# Patient Record
Sex: MALE | Race: WHITE | NOT HISPANIC OR LATINO | URBAN - METROPOLITAN AREA
[De-identification: names, ages, dates, MRNs, and addresses within clinical notes are randomized per-mention and may not be internally consistent; named-entity substitution may affect disease eponyms.]

---

## 2022-03-31 ENCOUNTER — OFFICE VISIT (OUTPATIENT)
Dept: FAMILY MEDICINE | Facility: CLINIC | Age: 27
End: 2022-03-31
Payer: COMMERCIAL

## 2022-03-31 VITALS
WEIGHT: 205 LBS | TEMPERATURE: 98.3 F | BODY MASS INDEX: 38.71 KG/M2 | HEART RATE: 73 BPM | SYSTOLIC BLOOD PRESSURE: 124 MMHG | DIASTOLIC BLOOD PRESSURE: 78 MMHG | OXYGEN SATURATION: 96 % | HEIGHT: 61 IN

## 2022-03-31 DIAGNOSIS — F90.0 ATTENTION DEFICIT HYPERACTIVITY DISORDER (ADHD), PREDOMINANTLY INATTENTIVE TYPE: Primary | ICD-10-CM

## 2022-03-31 DIAGNOSIS — F31.61 BIPOLAR DISORDER, CURRENT EPISODE MIXED, MILD (H): ICD-10-CM

## 2022-03-31 DIAGNOSIS — J30.1 NON-SEASONAL ALLERGIC RHINITIS DUE TO POLLEN: ICD-10-CM

## 2022-03-31 DIAGNOSIS — F51.01 PRIMARY INSOMNIA: ICD-10-CM

## 2022-03-31 DIAGNOSIS — F33.0 MILD EPISODE OF RECURRENT MAJOR DEPRESSIVE DISORDER (H): ICD-10-CM

## 2022-03-31 PROCEDURE — 99203 OFFICE O/P NEW LOW 30 MIN: CPT | Performed by: NURSE PRACTITIONER

## 2022-03-31 RX ORDER — DEXTROAMPHETAMINE SACCHARATE, AMPHETAMINE ASPARTATE MONOHYDRATE, DEXTROAMPHETAMINE SULFATE AND AMPHETAMINE SULFATE 2.5; 2.5; 2.5; 2.5 MG/1; MG/1; MG/1; MG/1
10 CAPSULE, EXTENDED RELEASE ORAL DAILY
Qty: 30 CAPSULE | Refills: 0 | Status: SHIPPED | OUTPATIENT
Start: 2022-03-31 | End: 2022-05-12

## 2022-03-31 RX ORDER — CITALOPRAM HYDROBROMIDE 20 MG/1
1 TABLET ORAL DAILY
COMMUNITY
Start: 2021-01-11

## 2022-03-31 RX ORDER — LAMOTRIGINE 100 MG/1
200 TABLET ORAL
COMMUNITY
Start: 2021-04-05 | End: 2022-05-03

## 2022-03-31 RX ORDER — MONTELUKAST SODIUM 10 MG/1
10 TABLET ORAL EVERY MORNING
Qty: 90 TABLET | Refills: 3 | Status: SHIPPED | OUTPATIENT
Start: 2022-03-31

## 2022-03-31 RX ORDER — TRAZODONE HYDROCHLORIDE 100 MG/1
200 TABLET ORAL AT BEDTIME
Qty: 90 TABLET | Refills: 1 | Status: SHIPPED | OUTPATIENT
Start: 2022-03-31 | End: 2022-05-12

## 2022-03-31 RX ORDER — TRAZODONE HYDROCHLORIDE 100 MG/1
200 TABLET ORAL DAILY
COMMUNITY
Start: 2021-04-20 | End: 2022-03-31

## 2022-03-31 RX ORDER — MONTELUKAST SODIUM 10 MG/1
10 TABLET ORAL EVERY MORNING
COMMUNITY
End: 2022-03-31

## 2022-03-31 RX ORDER — DEXTROAMPHETAMINE SACCHARATE, AMPHETAMINE ASPARTATE MONOHYDRATE, DEXTROAMPHETAMINE SULFATE AND AMPHETAMINE SULFATE 2.5; 2.5; 2.5; 2.5 MG/1; MG/1; MG/1; MG/1
10 CAPSULE, EXTENDED RELEASE ORAL DAILY
COMMUNITY
Start: 2021-07-21 | End: 2022-03-31

## 2022-03-31 ASSESSMENT — ANXIETY QUESTIONNAIRES
IF YOU CHECKED OFF ANY PROBLEMS ON THIS QUESTIONNAIRE, HOW DIFFICULT HAVE THESE PROBLEMS MADE IT FOR YOU TO DO YOUR WORK, TAKE CARE OF THINGS AT HOME, OR GET ALONG WITH OTHER PEOPLE: NOT DIFFICULT AT ALL
3. WORRYING TOO MUCH ABOUT DIFFERENT THINGS: NOT AT ALL
1. FEELING NERVOUS, ANXIOUS, OR ON EDGE: NOT AT ALL
2. NOT BEING ABLE TO STOP OR CONTROL WORRYING: NOT AT ALL
5. BEING SO RESTLESS THAT IT IS HARD TO SIT STILL: NOT AT ALL
7. FEELING AFRAID AS IF SOMETHING AWFUL MIGHT HAPPEN: NOT AT ALL
GAD7 TOTAL SCORE: 0
6. BECOMING EASILY ANNOYED OR IRRITABLE: NOT AT ALL

## 2022-03-31 ASSESSMENT — PATIENT HEALTH QUESTIONNAIRE - PHQ9
5. POOR APPETITE OR OVEREATING: NOT AT ALL
SUM OF ALL RESPONSES TO PHQ QUESTIONS 1-9: 4

## 2022-03-31 NOTE — PATIENT INSTRUCTIONS
Patient Education     WELCOME to [place]! It was very nice to meet you today, Darden!    - I refilled medications and sent them to the [facility] pharmacy on file   - A mental health referral was placed today for a psychiatrist medication evaluation and  management. [facility] will call you to coordinate your care as prescribed by your  provider. If you don't hear from a representative within 2 business days, please call [phone].   - Please see below for additional resources and information on some of your chronic health  conditions.  [place] Mental Health Resources    [facility], Peer Led Support Groups for Recovery & Wellness: https://mentalhealthmn.org/support/support-groups/    [facility] support groups: https://mentalhealthmn.org/support/support-groups/    [facility] support groups: http://www.[facility].org/support/support-groups.html    [facility] Peer Connection support groups: http://[facility].org/what-we-do/support-services/people-living-with-mental-illness.html    [place] Recovery Connection, support, care and resources to achieve recovery from addiction: https://[facility].org/upcoming-events/category/all-recovery-meetings/month/    Suicide Awareness Voices of Education (SAVE), grief support groups for those who have lost a friend or family member to suicide: https://save.org/what-we-do/grief-support/find-a-support-group/  [place] Mental Health Resources:    National Institutes of Mental Trdfxk610-030-5849jdp.[facility].nih.gov    National [place] on Mental Znpnddv997-169-2121wlr.gamaliel.org     Mental Health Lpqqucu237-835-1027mfg.nmha.org    National Suicide Oxkutgf700-852-1318 (800-SUICIDE)    National Suicide Prevention Qnehllzh120-260-4888 (606-869-OXKO)www.suicidepreventionlifeline.org  Attention-Deficit/Hyperactivity Disorder (ADHD) in Adults  You ve always had trouble concentrating. Your mind wanders, and it s hard to finish tasks. As a result, you  didn t do well in school. And now, you often struggle with your job. Sometimes this makes you syed or depressed. These may be symptoms of attention-deficit/hyperactivity disorder (ADHD). To find out more, talk to your healthcare provider. He or she can offer guidance and support.  Symptoms of ADHD in adults  For an adult to be diagnosed with ADHD, the symptoms must have been present since childhood. The symptoms may include:    Trouble thinking things through    Low self-esteem    Depression    Trouble holding a job    Memory problems    Problems with a marriage or relationship    Lack of discipline    Trouble finishing tasks or projects  What is ADHD?  Attention-deficit/hyperactivity disorder makes it hard to focus your mind. You may daydream a lot. And you may be restless much of the time. As a result, you may have trouble with detailed or boring work. And it may be hard to stick with one project for very long. You also may forget things. Or, you may miss key points during a lecture or meeting. You may even have trouble sitting through a movie or concert. At times, you may feel frustrated or angry. This can affect your relationships with others.  Who does it affect?  ADHD starts in childhood. Sometimes, your symptoms may improve as you get older. But they also may persist into your adult years. ADHD is often thought of as a  kid s problem.  That s why it s often missed in adults. In fact, many parents learn they have ADHD when their children are diagnosed.  What causes it?  The exact cause of ADHD isn t known. The disorder does run in families. Having one parent with ADHD makes it more likely you ll have it too. And the part of your brain that controls attention may be involved. Certain brain chemicals that are out of balance may also play a role.  What can be done?  The first step is finding out if you have ADHD. Your doctor will use special guidelines to diagnose the disorder. Most adults with ADHD are greatly  helped by some combination of medicine, therapy and coaching.  To learn more    Children and Adults with Attention-Deficit/Hyperactivity Disorder (KEO) https://keo.org/    Attention Deficit Disorder Association (ADDA) https://add.org/    Treating Insomnia     Learning to relax before bedtime can improve your sleep.   Good sleeping habits are a key part of treatment. If needed, some medicines may help you sleep better at first. Making healthy lifestyle changes and learning to relax can improve your sleep. Treating insomnia takes commitment. But trust that your efforts will pay off. Be sure to talk with your healthcare provider before taking any medicine.  Healthy lifestyle  Your lifestyle affects your health and your sleep. Here are some healthy habits:    Keep a regular sleep schedule. Go to bed and get up at the same time each day.    Exercise regularly. It may help you reduce stress. Avoid strenuous exercise for 2 to 4 hours before bedtime.    Avoid or limit naps, especially in the late afternoon.    Use your bed only for sleep and sex.    Don t spend too much time in bed trying to fall asleep. If you can t fall asleep, get up and do something (no electronics) until you become tired and drowsy.    Avoid or limit caffeine and nicotine for up to 6 hours before bedtime. They can keep you awake at night.     Also avoid alcohol for at least 4 to 6 hours before bedtime. It may help you fall asleep at first. But you will have more awakenings during the night. And your sleep will not be restful.  Before bedtime  To sleep better every night, try these tips:    Have a bedtime routine to let your body and mind know when it s time to sleep.    Think of going to bed as relaxing and enjoyable. Sleep will come sooner.    If your worries don t let you sleep, write them down in a diary. Then close it, and go to bed.    Make sure the room is not too hot or too cold. If it s not dark enough, an eye mask can help. If it s noisy,  try using earplugs.    Don't eat a large meal just before bedtime.    Remove noises, bright lights, TVs, cell phones, and computers from your sleeping environment.    Use a comfortable mattress and pillow.  Learn to relax  Stress, anxiety, and body tension may keep you awake at night. To unwind before bedtime, try a warm bath, meditation, or yoga. Also try the following:    Deep breathing. Sit or lie back in a chair. Take a slow, deep breath. Hold it for 5 counts. Then breathe out slowly through your mouth. Keep doing this until you feel relaxed.    Progressive muscle relaxation. Tense and then relax the muscles in your body as you breathe deeply. Start with your feet and work up your body to your neck and face.  Cognitive Behavioral Therapy (CBT)  CBT is the most effective treatment for long-term insomnia. It tries to address the underlying causes of your sleep problems, including your habits and how you think about sleep.  Individual Therapy  He Doe PsyD,   Insomnia   [facility]    [facility]: [phone]    [facility]: [phone]  [facility]  Visit www.[place].org or call [phone] to find a clinic close to you.  Suggested resources  The resources below may help you relax. This list is for information only. [facility] is not responsible for the quality of services or the actions of any person or organization. There may be a fee to use some of these resources.  Insomnia treatment books  Darryl Mind by Kathy Ramsey and Kenya Heaton (2013)  Overcoming Insomnia by Patrick Figueroa and Kathy Ramsey (2008)  Quiet Your Mind and Get to Sleep: Solutions to Insomnia for Those with Depression, Anxiety or Chronic Pain by Kathy Heaton (2009)  No More Sleepless Nights by Victorino Nelson and Belle Cheng (1996)  Say Darryl to Insomnia by Steven De La Cruz (2009)  The Insomnia  Workbook by Dian Willingham and Rod Hassan (2009)  The Insomnia Answer by Keith Qiu and Arsenio Malhtora (2006)  Stress management and relaxation books  The Relaxation and Stress Reduction Workbook by Trena Matthews, Chloé Tamez and Jose Miguel Saenz (2008)  Stress Management Workbook: Techniques and Self-Assessment Procedures by Polina Wesley and Moshe Shearer (1997)  A Mindfulness-Based Stress Reduction Workbook by Benson Norton and Raya Murphy (2010)  The Complete Stress Management Workbook by Ignacio Arrieta, Elijah Chen and Bryon Mason (1996)  Online programs  www.SHUTi.me (pronounced shut eye). There is a fee for this program.   www.sleepIO.com (pronounced sleep ee oh). There is a fee for this program.  Other online resources  Deep breathing and progressive muscle relaxation (PMR):    http://www.[employer].First Insight  Meditation:    www.StayTunedranThe Honest Company    www.M[employer] GroupguidedTapInfluencemeditationTapInfluencesite.First Insight  Guided imagery:    http://www."[employer]"    http://Sliced Apples.First Insight  (click on  Resource Library,  then choose  Meditation, Relaxation, Guided Imagery )  Apps for your mobile device:    Autogenic Training Progressive Muscle Relaxation by Yovigo Jessica    Calm: Meditation and Sleep Stories by [employer]    Perminova Timer - Meditation Darcy by [employer].  This is not a prescription and these resources are optional. You must pay for any costs when using these resources. Please ask your insurance carrier if you can be reimbursed for these resources. If so, you are responsible for sending the needed details to your insurance carrier. These resources may also be tax deductible as medical expenses. Check with your .  Date Last Reviewed: 5/18/2018  Clinically reviewed by He Doe PsyD, GINI, CBSM, Director of the Insomnia and Behavioral Sleep Health Program, [facility].  For informational purposes only. Not to replace the advice of your health  care provider.  Copyright   2018 [facility]. All rights reserved.

## 2022-03-31 NOTE — NURSING NOTE
Chief Complaint   Patient presents with     Establish Care     new to [place] and need medication refill     Gina Ordonez CMA at 2:32 PM on 3/31/2022.

## 2022-03-31 NOTE — PROGRESS NOTES
Today's Date: Mar 31, 2022     Patient Adelso Constantino 1995 presents to the clinic today to address   Chief Complaint   Patient presents with     Establish Care     new to [place] and need medication refill           SUBJECTIVE     History of Present Illness:    Adelso presents to the NP clinic today to establish care and discuss medication refills as he just relocated from [place] to [place]. He is , has two dogs, and lives in a house. Is excited for the dogs to have a yard. Will be starting school at [employer] next fall to obtain math degree.     He notes that his mental health is overall well-controlled. He states that he has felt surprisingly stable following the move to [place] and change in weather. No SI/SH. He takes his medications as prescribed. Was working with a psychiatrist in [place] for medication management and would like a referral to establish care with a psychiatrist in [place]. He primarily needs a refill on his Adderall XR as he has been out for several days now and is noticing breakthrough symptoms. Started Adderall XR July of last year and notes a significant increase in focus since being on it. Reports no adverse effects of medication. Also requests a refill on Trazodone, which he has been on for >1 year for insomnia. He feels that it has an overall good effect, but can feel sleepy or groggy in the morning still - however, he notes this to be tolerable. Tried Seroquel in the past, but stopped taking due to side effects of medication. He would like to stick with current regimen, but would potentially like to discuss other medication options with psychiatrist. Lastly, he is requesting a refill of his Singulair that he takes for chronic allergies. Notes to be symptomatic year-round and not as much seasonal. Notes good response to singular. Denies wheezing, SOB, or chest pain.     PHQ 3/31/2022   PHQ-9 Total Score 4   Q9: Thoughts of better off dead/self-harm past 2 weeks Not at all     NISHANT-7  "SCORE 3/31/2022   Total Score 0      ROS: 10 point ROS neg other than the symptoms noted above in the HPI.    No Known Allergies     Current Outpatient Medications   Medication Instructions     amphetamine-dextroamphetamine (ADDERALL XR) 10 MG 24 hr capsule 10 mg, Oral, DAILY, extended release     citalopram (CELEXA) 20 MG tablet 1 tablet, Oral, DAILY     lamoTRIgine (LAMICTAL) 200 mg, Oral     montelukast (SINGULAIR) 10 mg, Oral, EVERY MORNING     traZODone (DESYREL) 200 mg, Oral, DAILY, Taking at night     Past Medical History:   Diagnosis Date     ADD (attention deficit disorder)      Bipolar 2 disorder (H)      Non-seasonal allergic rhinitis      Primary insomnia      Recurrent major depressive disorder (H)       Family History   Problem Relation Age of Onset     Hypertension Mother      Diabetes Mother      Hypertension Father      Hodgkin's lymphoma Sister      Hypertension Brother      Glaucoma Paternal Grandmother      Alzheimer Disease Paternal Grandfather       Social History     Tobacco Use     Smoking status: Never Smoker     Smokeless tobacco: Never Used   Vaping Use     Vaping Use: Never used   Substance Use Topics     Alcohol use: Yes     Comment: ocassional     Drug use: Never      History   Sexual Activity     Sexual activity: Yes     Partners: Male          OBJECTIVE     /78 (BP Location: Left arm, Patient Position: Sitting, Cuff Size: Adult Large)   Pulse 73   Temp 98.3  F (36.8  C) (Oral)   Ht 1.549 m (5' 1\")   Wt 93 kg (205 lb)   SpO2 96%   BMI 38.73 kg/m       Labs:  No results found for: WBC, HGB, HCT, PLT, CHOL, TRIG, HDL, LDLDIRECT, ALT, AST, NA, CREATININE, BUN, CO2, TSH, PSA, INR, GLUF, HGBA1C, MICROALBUR     Physical Exam  Vitals and nursing note reviewed.   Constitutional:       General: He is not in acute distress.     Appearance: Normal appearance. He is not ill-appearing.   HENT:      Head: Normocephalic and atraumatic.   Eyes:      General: Lids are normal.      " Conjunctiva/sclera: Conjunctivae normal.   Pulmonary:      Effort: Pulmonary effort is normal.   Skin:     Comments: Skin intact with no visible lesions.   Neurological:      General: No focal deficit present.      Mental Status: He is alert and oriented to person, place, and time.      Gait: Gait is intact.   Psychiatric:         Mood and Affect: Mood normal.         Behavior: Behavior normal.         Thought Content: Thought content normal.         Judgment: Judgment normal.            ASSESSMENT/PLAN     (F90.0) Attention deficit hyperactivity disorder (ADHD), predominantly inattentive type  (primary encounter diagnosis)  Comment: Stable, continue with current regimen. Medication refilled. Mental health referral placed to establish care and evaluate medication regimen effectiveness or optimize as needed. Will appreciate psychiatrist's insight and defer to their treatment recommendations.    Plan: amphetamine-dextroamphetamine (ADDERALL XR) 10         MG 24 hr capsule, Adult Mental [facility]         Referral    (F51.01) Primary insomnia  Comment: Stable, continue with current regimen. Medication refilled. Mental health referral placed to establish care and evaluate medication regimen effectiveness or optimize as needed. Will appreciate psychiatrist's insight and defer to their treatment recommendations.   Plan: traZODone (DESYREL) 100 MG tablet, Adult Mental        [facility]  Referral    (J30.1) Non-seasonal allergic rhinitis due to pollen  Comment: Stable, continue with current regimen. Medication refilled.   Plan: montelukast (SINGULAIR) 10 MG tablet    (F33.0) Mild episode of recurrent major depressive disorder (H)  Comment: Stable, continue with current regimen. Mental health referral placed to establish care and evaluate medication regimen effectiveness or optimize as needed. Will appreciate psychiatrist's insight and defer to their treatment recommendations.    Plan: [facility] [place]  Referral    (F31.61) Bipolar disorder, current episode mixed, mild (H)  Comment: Stable, continue with current regimen. Mental health referral placed to establish care and evaluate medication regimen effectiveness or optimize as needed. Will appreciate psychiatrist's insight and defer to their treatment recommendations.   Plan: Adult Mental Health  Referral     Follow-Up:  - Follow up as needed, or if symptoms worsen or fail to improve.     Options for treatment and follow-up care were reviewed with the patient. Patient engaged in the decision making process and verbalized understanding of the options discussed and agreed with the final plan.  AVS printed and given to patient.    ALYCIA Ambrocio [facility]  [facility]  [address] [address] [phone]

## 2022-04-01 ASSESSMENT — ANXIETY QUESTIONNAIRES: GAD7 TOTAL SCORE: 0

## 2022-04-03 ENCOUNTER — HEALTH MAINTENANCE LETTER (OUTPATIENT)
Age: 27
End: 2022-04-03

## 2022-05-03 DIAGNOSIS — F31.61 BIPOLAR DISORDER, CURRENT EPISODE MIXED, MILD (H): Primary | ICD-10-CM

## 2022-05-05 RX ORDER — LAMOTRIGINE 100 MG/1
200 TABLET ORAL DAILY
Qty: 90 TABLET | Refills: 3 | Status: SHIPPED | OUTPATIENT
Start: 2022-05-05

## 2022-05-05 NOTE — TELEPHONE ENCOUNTER
Please call Adelso to let him know that he will need to come in to the clinic to get his labs drawn for necessary monitoring while on this medication. I will renew the prescription so he does not run out. Also, please check to see if he has been able to schedule a visit with a psychiatrist and if not, please remind him to do so. Thank you! -Breanne

## 2022-05-05 NOTE — TELEPHONE ENCOUNTER
lamoTRIgine (LAMICTAL) 100 MG  Last Written Prescription Date:  UNK  Last Fill Quantity: UNK,   # refills: UNK  Last Office Visit : 3/31/22  Future Office visit:  NONE  Routing refill request to provider for review/approval because:  Drug not active on patient's medication list / HISTORICAL  Med is included in med list / who is RF this med ?

## 2022-05-06 NOTE — TELEPHONE ENCOUNTER
I called and LVM for patient that you would like to have them schedule an appointment for labs. Also told them that you were wondering on if they have schedule an appointment with psychiatrist.

## 2022-05-12 ENCOUNTER — MYC REFILL (OUTPATIENT)
Dept: FAMILY MEDICINE | Facility: CLINIC | Age: 27
End: 2022-05-12
Payer: COMMERCIAL

## 2022-05-12 DIAGNOSIS — F90.0 ATTENTION DEFICIT HYPERACTIVITY DISORDER (ADHD), PREDOMINANTLY INATTENTIVE TYPE: ICD-10-CM

## 2022-05-12 DIAGNOSIS — F51.01 PRIMARY INSOMNIA: ICD-10-CM

## 2022-05-12 RX ORDER — DEXTROAMPHETAMINE SACCHARATE, AMPHETAMINE ASPARTATE MONOHYDRATE, DEXTROAMPHETAMINE SULFATE AND AMPHETAMINE SULFATE 2.5; 2.5; 2.5; 2.5 MG/1; MG/1; MG/1; MG/1
10 CAPSULE, EXTENDED RELEASE ORAL DAILY
Qty: 30 CAPSULE | Refills: 0 | Status: SHIPPED | OUTPATIENT
Start: 2022-05-12 | End: 2022-06-23

## 2022-05-12 NOTE — TELEPHONE ENCOUNTER
traZODone (DESYREL) 100 MG tablet  Last Written Prescription Date:   3/31/2022  Last Fill Quantity: 90,   # refills: 1  Last Office Visit :  3/31/2022  Future Office visit: None    Routing refill request to provider for review/approval because:  Continue this med.   Looks like with last order.   It was mentioned that this would be review with a referral to psychiatrist insight.  Please advise regarding refills.       Karlie Beach RN  Central Triage Red Flags/Med Refills    Primary insomnia 3/31/2022  Comment: Stable, continue with current regimen. Medication refilled. Mental health referral placed to establish care and evaluate medication regimen effectiveness or optimize as needed. Will appreciate psychiatrist's insight and defer to their treatment recommendations.   Plan: traZODone (DESYREL) 100 MG tablet, Adult Mental        Health  Referral

## 2022-05-16 RX ORDER — TRAZODONE HYDROCHLORIDE 100 MG/1
200 TABLET ORAL AT BEDTIME
Qty: 180 TABLET | Refills: 0 | Status: SHIPPED | OUTPATIENT
Start: 2022-05-16 | End: 2022-06-28

## 2022-05-16 NOTE — TELEPHONE ENCOUNTER
Medication refilled. Please contact patient to remind that him to schedule an appointment with psychiatrist. Thank you!

## 2022-06-23 ENCOUNTER — MYC REFILL (OUTPATIENT)
Dept: FAMILY MEDICINE | Facility: CLINIC | Age: 27
End: 2022-06-23

## 2022-06-23 DIAGNOSIS — F90.0 ATTENTION DEFICIT HYPERACTIVITY DISORDER (ADHD), PREDOMINANTLY INATTENTIVE TYPE: ICD-10-CM

## 2022-06-23 RX ORDER — DEXTROAMPHETAMINE SACCHARATE, AMPHETAMINE ASPARTATE MONOHYDRATE, DEXTROAMPHETAMINE SULFATE AND AMPHETAMINE SULFATE 2.5; 2.5; 2.5; 2.5 MG/1; MG/1; MG/1; MG/1
10 CAPSULE, EXTENDED RELEASE ORAL DAILY
Qty: 30 CAPSULE | Refills: 0 | Status: SHIPPED | OUTPATIENT
Start: 2022-06-23 | End: 2022-08-03

## 2022-06-24 DIAGNOSIS — F51.01 PRIMARY INSOMNIA: ICD-10-CM

## 2022-06-28 RX ORDER — TRAZODONE HYDROCHLORIDE 100 MG/1
200 TABLET ORAL AT BEDTIME
Qty: 180 TABLET | Refills: 1 | Status: SHIPPED | OUTPATIENT
Start: 2022-06-28

## 2022-06-28 NOTE — TELEPHONE ENCOUNTER
traZODone (DESYREL) 100 MG tablet  Last Written Prescription Date:   5/16/2022  Last Fill Quantity: 180,   # refills: 0  Last Office Visit :  3/31/2022  Future Office visit:  None  180 Tabs, 1 refill sent to pharm 6/28/2022      Karlie Beach RN  Central Triage Red Flags/Med Refills      Warnings Override History for traZODone (DESYREL) 100 MG tablet [[id number]]    Overridden by Breanne Segura APRN CNP on May 16, 2022 9:03 AM   Drug-Drug   1. SELECTIVE SEROTONIN REUPTAKE INHIBITORS / SEROTONERGIC NON-OPIOID CNS DEPRESSANTS [Level: Major] [Reason: Tolerated medication/side effects in past]   Other Orders: citalopram (CELEXA) 20 MG tablet

## 2022-08-03 DIAGNOSIS — F90.0 ATTENTION DEFICIT HYPERACTIVITY DISORDER (ADHD), PREDOMINANTLY INATTENTIVE TYPE: ICD-10-CM

## 2022-08-03 RX ORDER — DEXTROAMPHETAMINE SACCHARATE, AMPHETAMINE ASPARTATE MONOHYDRATE, DEXTROAMPHETAMINE SULFATE AND AMPHETAMINE SULFATE 2.5; 2.5; 2.5; 2.5 MG/1; MG/1; MG/1; MG/1
10 CAPSULE, EXTENDED RELEASE ORAL DAILY
Qty: 30 CAPSULE | Refills: 0 | Status: SHIPPED | OUTPATIENT
Start: 2022-08-03

## 2022-10-03 ENCOUNTER — HEALTH MAINTENANCE LETTER (OUTPATIENT)
Age: 27
End: 2022-10-03

## 2023-05-21 ENCOUNTER — HEALTH MAINTENANCE LETTER (OUTPATIENT)
Age: 28
End: 2023-05-21

## 2024-07-28 ENCOUNTER — HEALTH MAINTENANCE LETTER (OUTPATIENT)
Age: 29
End: 2024-07-28